# Patient Record
(demographics unavailable — no encounter records)

---

## 2025-06-24 NOTE — PROCEDURE
[Epistaxis] : evaluation of epistaxis [Anterior rhinoscopy insufficient to account for symptoms] : anterior rhinoscopy insufficient to account for symptoms [Flexible Endoscope] : examined with the flexible endoscope [Normal] : the paranasal sinuses had no abnormalities [FreeTextEntry1] : dry-

## 2025-06-24 NOTE — HISTORY OF PRESENT ILLNESS
[de-identified] : 5-year-old female presents for initial evaluation for: epistaxis. Accompanied by guardian.  Reports that she went to ED 6/14/25 for epistaxis. Woke up with nosebleed. Bleeding lasted for about 15 minutes. Bleeding from right side.  It happened only one time. Pediatrician did bloodwork for blood clotting as per mom, it came back negative.